# Patient Record
Sex: MALE | Race: WHITE | NOT HISPANIC OR LATINO | Employment: OTHER | URBAN - NONMETROPOLITAN AREA
[De-identification: names, ages, dates, MRNs, and addresses within clinical notes are randomized per-mention and may not be internally consistent; named-entity substitution may affect disease eponyms.]

---

## 2018-09-06 ENCOUNTER — HOSPITAL ENCOUNTER (OUTPATIENT)
Dept: GENERAL RADIOLOGY | Facility: HOSPITAL | Age: 68
Discharge: HOME | End: 2018-09-06
Attending: NEUROLOGICAL SURGERY | Admitting: NEUROLOGICAL SURGERY

## 2018-09-06 ENCOUNTER — OFFICE VISIT (OUTPATIENT)
Dept: NEUROSURGERY | Facility: CLINIC | Age: 68
End: 2018-09-06

## 2018-09-06 VITALS
WEIGHT: 198.2 LBS | DIASTOLIC BLOOD PRESSURE: 74 MMHG | BODY MASS INDEX: 26.84 KG/M2 | HEIGHT: 72 IN | SYSTOLIC BLOOD PRESSURE: 134 MMHG

## 2018-09-06 DIAGNOSIS — Z78.9 NON-SMOKER: ICD-10-CM

## 2018-09-06 DIAGNOSIS — M43.10 ACQUIRED SPONDYLOLISTHESIS: Primary | ICD-10-CM

## 2018-09-06 DIAGNOSIS — M51.37 DEGENERATION OF LUMBAR OR LUMBOSACRAL INTERVERTEBRAL DISC: ICD-10-CM

## 2018-09-06 DIAGNOSIS — M43.10 ACQUIRED SPONDYLOLISTHESIS: ICD-10-CM

## 2018-09-06 DIAGNOSIS — M48.062 SPINAL STENOSIS, LUMBAR REGION, WITH NEUROGENIC CLAUDICATION: ICD-10-CM

## 2018-09-06 PROCEDURE — 99203 OFFICE O/P NEW LOW 30 MIN: CPT | Performed by: NEUROLOGICAL SURGERY

## 2018-09-06 PROCEDURE — 72114 X-RAY EXAM L-S SPINE BENDING: CPT

## 2018-09-06 RX ORDER — ATORVASTATIN CALCIUM 10 MG/1
TABLET, FILM COATED ORAL
Refills: 0 | COMMUNITY
Start: 2018-06-26

## 2018-09-06 RX ORDER — DICLOFENAC SODIUM 75 MG/1
75 TABLET, DELAYED RELEASE ORAL 2 TIMES DAILY
Qty: 60 TABLET | Refills: 1 | Status: SHIPPED | OUTPATIENT
Start: 2018-09-06

## 2018-09-06 RX ORDER — OMEPRAZOLE 20 MG/1
CAPSULE, DELAYED RELEASE ORAL
Refills: 3 | COMMUNITY
Start: 2018-07-18

## 2018-09-06 RX ORDER — CYCLOBENZAPRINE HCL 10 MG
10 TABLET ORAL 3 TIMES DAILY PRN
Qty: 90 TABLET | Refills: 1 | Status: SHIPPED | OUTPATIENT
Start: 2018-09-06

## 2018-09-06 RX ORDER — HYDROCHLOROTHIAZIDE 12.5 MG/1
TABLET ORAL DAILY
Refills: 3 | COMMUNITY
Start: 2018-07-31

## 2018-09-06 RX ORDER — GABAPENTIN 600 MG/1
TABLET ORAL
Refills: 3 | COMMUNITY
Start: 2018-06-04

## 2018-09-06 RX ORDER — METHYLPREDNISOLONE 4 MG/1
TABLET ORAL
Qty: 1 EACH | Refills: 0 | Status: SHIPPED | OUTPATIENT
Start: 2018-09-06 | End: 2018-10-18

## 2018-09-06 NOTE — PROGRESS NOTES
Patient: Ezra Milner  : 1950    Primary Care Provider: Marvin Quach MD    Requesting Provider: Carmita Quach, *        History    Chief Complaint: back pain  Chief Complaint   Patient presents with   • back & leg pain     patient brought imaging CDs in for review; patient states his left hip/leg is the painful one.       History of Present Illness: 67 Yo male with history of low back pain he describes left lower extremity pain also.  He is 50% back pain and essentially 50% lower extremity radicular pain.  He gets pain in the left paraspinal area that radiates in a radicular fashion down the lateral aspect of his leg and calf to the top of his foot.  He denies any numbness and tingling.  Its worse with any sort of activities.  He does better if he gets off his feet lies down.  He takes gabapentin 600 mg 3 times a day with good effect.  He has done no steroids or anti-inflammatory treatments recently.  He has had chiropractic care on and off over the years.  He says he did physical therapy 5+ years ago with minimal benefit.  He is done no pain management injections.  He does not take oral pain medication.    Review of Systems   Musculoskeletal: Positive for back pain.   All other systems reviewed and are negative.      Past Medical History:   Past Medical History:   Diagnosis Date   • Histoplasmosis    • Hypertension    • Low back pain        Past Surgical History:   Past Surgical History:   Procedure Laterality Date   • LASIK Bilateral    • LUNG BIOPSY      histoplasmosis       Family History: family history is not on file.    Social History:  reports that he has never smoked. He has never used smokeless tobacco. He reports that he does not drink alcohol or use drugs.    Medications:    (Not in a hospital admission)    Allergies:  Codeine    Physical Exam:     Physical Exam   Constitutional: He is oriented to person, place, and time. He appears well-developed and well-nourished.    Cardiovascular: Normal rate and regular rhythm.    Pulmonary/Chest: Effort normal. No respiratory distress.   Abdominal: Soft. He exhibits no distension. There is no tenderness.   Neurological: He is oriented to person, place, and time. He has normal strength. He has a normal Finger-Nose-Finger Test and a normal Tandem Gait Test. Gait normal.   Psychiatric: His speech is normal.       Neurologic Exam     Mental Status   Oriented to person, place, and time.   Attention: normal.   Speech: speech is normal   Level of consciousness: alert  Knowledge: good.     Cranial Nerves   Cranial nerves II through XII intact.     Motor Exam   Muscle bulk: normal  Overall muscle tone: normal  Right arm pronator drift: absent  Left arm pronator drift: absent    Strength   Strength 5/5 throughout.     Sensory Exam   Light touch normal.   Pinprick normal.     Gait, Coordination, and Reflexes     Gait  Gait: normal    Coordination   Finger to nose coordination: normal  Tandem walking coordination: normal    Tremor   Resting tremor: absent  Intention tremor: absent  Action tremor: absent    Reflexes   Reflexes 2+ except as noted.     Positive straight leg raise on the left at 45°.  Independent Review of Radiographic Studies:    MRI of plain x-rays of the lumbar spine do show degenerative disc disease at most notable at L4 5 and 51.  There is foraminal stenosis and osteophytic spurring at those levels.  L5-S1 also is notable for a suggestion of a spondylolisthesis.    ASSESSMENT/PLGeorge has back pain and left lower extremity radicular pain.  His MRI demonstrates severe degenerative disc disease and foraminal stenosis at 2 levels of his back although I suspect the L5-S1 levels more responsible for his discomfort.  He has been several years since he pursued any conservative care we are going get him back into a dedicated course of physical therapy start him on a steroid taper muscle relaxers anti-inflammatories.  Wearing a get standing  flexion extension films.  Should these conservative measures fail he may be a candidate for a left transforaminal lumbar interbody fusion at 2 levels.  1. Acquired spondylolisthesis    2. Spinal stenosis, lumbar region, with neurogenic claudication    3. Degeneration of lumbar or lumbosacral intervertebral disc    4. BMI 26.0-26.9,adult    5. Non-smoker          Return in about 6 weeks (around 10/18/2018) for PT follow up w/IVANIA.      Zhao Martines MD

## 2018-09-06 NOTE — PATIENT INSTRUCTIONS
PATIENT TO CONTINUE TO FOLLOW UP WITH HIS PRIMARY CARE PROVIDER FOR YEARLY PHYSICAL EXAMS TO ENSURE COMPLETE HEALTH MAINTENANCE.    BMI for Adults  Body mass index (BMI) is a number that is calculated from a person's weight and height. In most adults, the number is used to find how much of an adult's weight is made up of fat. BMI is not as accurate as a direct measure of body fat.  How is BMI calculated?  BMI is calculated by dividing weight in kilograms by height in meters squared. It can also be calculated by dividing weight in pounds by height in inches squared, then multiplying the resulting number by 703. Charts are available to help you find your BMI quickly and easily without doing this calculation.  How is BMI interpreted?  Health care professionals use BMI charts to identify whether an adult is underweight, at a normal weight, or overweight based on the following guidelines:  · Underweight: BMI less than 18.5.  · Normal weight: BMI between 18.5 and 24.9.  · Overweight: BMI between 25 and 29.9.  · Obese: BMI of 30 and above.    BMI is usually interpreted the same for males and females.  Weight includes both fat and muscle, so someone with a muscular build, such as an athlete, may have a BMI that is higher than 24.9. In cases like these, BMI may not accurately depict body fat. To determine if excess body fat is the cause of a BMI of 25 or higher, further assessments may need to be done by a health care provider.  Why is BMI a useful tool?  BMI is used to identify a possible weight problem that may be related to a medical problem or may increase the risk for medical problems. BMI can also be used to promote changes to reach a healthy weight.  This information is not intended to replace advice given to you by your health care provider. Make sure you discuss any questions you have with your health care provider.  Document Released: 08/29/2005 Document Revised: 04/27/2017 Document Reviewed: 05/15/2015  [employer] miguel  Interactive Patient Education © 2018 [employer]

## 2018-09-19 ENCOUNTER — TELEPHONE (OUTPATIENT)
Dept: NEUROSURGERY | Facility: CLINIC | Age: 68
End: 2018-09-19

## 2018-09-19 NOTE — TELEPHONE ENCOUNTER
Patient was seen on 9/6/18 and given therapy order.  He has had 2 PT sessions and his wife calls today stating his pain is worse, the Voltaren & Flexeril aren't helping, he sleeps almost all day (10 hours last night and is already back in bed now), and doesn't know what can be done for him.  She states the patient didn't want to tell Dr Martines how bad his pain was when he was seen in the office b/c he didn't really want surgery; but now has decided he would like to just proceed with the surgery.  I discussed with the patient's wife that he really needs to give therapy a chance to see if it will improve his symptoms but that I would discuss this with Dr Martines/Fabrice and let her know what he says.      earl escalera CMA      Forwarded to Dr Bobbi Avina for recommendation

## 2018-09-20 NOTE — TELEPHONE ENCOUNTER
Agree, he needs to continue with PT for the time and give it a chance. No changes to current plan. Thanks.

## 2018-10-18 ENCOUNTER — OFFICE VISIT (OUTPATIENT)
Dept: NEUROSURGERY | Facility: CLINIC | Age: 68
End: 2018-10-18

## 2018-10-18 VITALS
WEIGHT: 199.4 LBS | BODY MASS INDEX: 27.01 KG/M2 | DIASTOLIC BLOOD PRESSURE: 84 MMHG | HEIGHT: 72 IN | RESPIRATION RATE: 18 BRPM | SYSTOLIC BLOOD PRESSURE: 116 MMHG

## 2018-10-18 DIAGNOSIS — M43.10 ACQUIRED SPONDYLOLISTHESIS: ICD-10-CM

## 2018-10-18 DIAGNOSIS — M48.062 SPINAL STENOSIS, LUMBAR REGION, WITH NEUROGENIC CLAUDICATION: Primary | ICD-10-CM

## 2018-10-18 DIAGNOSIS — Z78.9 NON-SMOKER: ICD-10-CM

## 2018-10-18 DIAGNOSIS — M51.37 DEGENERATION OF LUMBAR OR LUMBOSACRAL INTERVERTEBRAL DISC: ICD-10-CM

## 2018-10-18 PROCEDURE — 99213 OFFICE O/P EST LOW 20 MIN: CPT | Performed by: NURSE PRACTITIONER

## 2018-10-18 RX ORDER — HYDROCODONE BITARTRATE AND ACETAMINOPHEN 7.5; 325 MG/1; MG/1
1 TABLET ORAL EVERY 6 HOURS PRN
COMMUNITY

## 2018-10-18 NOTE — PROGRESS NOTES
"    Chief complaint:   Chief Complaint   Patient presents with   • Acquired spondylolisthesis     Patient states he is having pain today. Physical therapy hasn't helped.       Subjective     HPI: This is a 68-year-old male gentleman who been following up for back and leg pain that has been 50-50.  His been through a dedicated course of physical therapy and is here for an evaluation.  It is known that the patient does have lumbar disc degeneration at L4-5 and L5-S1  with a spondylolisthesis at L5-S1.  He states that he did go to 10 sessions of physical therapy but did not have any significant meaningful relief in his back or leg pain.  He still complains of the pain being 50% back pain 50% leg pain.  Rates the pain on scale 0-10 at 7.  He says it does interfere with his activities daily living.  Denies any bowel or bladder incontinence.    Review of Systems   Musculoskeletal: Positive for back pain.   All other systems reviewed and are negative.       Past Medical History:   Diagnosis Date   • Arthritis    • Histoplasmosis    • Hyperlipemia    • Hypertension    • Low back pain      Past Surgical History:   Procedure Laterality Date   • LASIK Bilateral    • LUNG BIOPSY      histoplasmosis     Family History   Problem Relation Age of Onset   • Hyperlipidemia Mother    • Lung cancer Brother      Social History   Substance Use Topics   • Smoking status: Never Smoker   • Smokeless tobacco: Never Used   • Alcohol use No       (Not in a hospital admission)  Allergies:  Codeine    Objective      Vital Signs  /84 (BP Location: Left arm, Patient Position: Sitting, Cuff Size: Adult)   Resp 18   Ht 182.9 cm (72\")   Wt 90.4 kg (199 lb 6.4 oz)   BMI 27.04 kg/m²     Physical Exam   Constitutional: He is oriented to person, place, and time. He appears well-developed and well-nourished.   HENT:   Head: Normocephalic.   Eyes: Pupils are equal, round, and reactive to light. Conjunctivae, EOM and lids are normal.   Neck: " Normal range of motion.   Cardiovascular: Normal rate, regular rhythm and normal heart sounds.    Pulmonary/Chest: Effort normal and breath sounds normal. No respiratory distress.   Abdominal: Soft. Normal appearance. He exhibits no distension. There is no tenderness.   Musculoskeletal: Normal range of motion.        Lumbar back: He exhibits pain.   Neurological: He is alert and oriented to person, place, and time. He has normal strength and normal reflexes. He displays normal reflexes. No cranial nerve deficit or sensory deficit. GCS eye subscore is 4. GCS verbal subscore is 5. GCS motor subscore is 6.   Skin: Skin is warm.   Psychiatric: He has a normal mood and affect. His speech is normal and behavior is normal. Thought content normal. Cognition and memory are normal.       Results Review: MRI of the lumbar spine shows the patient does have lumbar disc degeneration present at L5-S1 and L4-5.  There is a spondylolisthesis present at L5-S1 as well.  No instability noted in flexion extension x-rays.          Assessment/Plan: I did discuss the patient that we can look into injections versus a surgical option.  He does feel like he would rather look into a surgical option at this time.  At this point I will have him come back and meet with Dr. Martines for an extensive surgical discussion regarding a 2 level T LIF.  BMI shows that he is overweight.  BMI chart was given the patient.  He is a nonsmoker      Ezra was seen today for acquired spondylolisthesis.    Diagnoses and all orders for this visit:    Spinal stenosis, lumbar region, with neurogenic claudication    Degeneration of lumbar or lumbosacral intervertebral disc    Acquired spondylolisthesis    Non-smoker    BMI 26.0-26.9,adult          I discussed the patients findings and my recommendations with patient    Fabrice Dejesus, APRN  10/18/18  10:29 AM

## 2018-10-18 NOTE — PATIENT INSTRUCTIONS

## 2018-10-19 ENCOUNTER — TELEPHONE (OUTPATIENT)
Dept: NEUROSURGERY | Facility: CLINIC | Age: 68
End: 2018-10-19

## 2018-10-19 NOTE — TELEPHONE ENCOUNTER
Patient's wife called and requested I call her b/c she says they were told they needed to discuss surgery with Dr Martines & yet we placed his appt in January.  I called her back & explained that the January appt is the soonest I have for Dr Martines right now.  He is on a wait list and I encouraged her to call me every Monday to see if someone had cancelled for the week.  She was okay with this once it was explained to her.      earl escalera CMA